# Patient Record
Sex: FEMALE | Race: BLACK OR AFRICAN AMERICAN | Employment: OTHER | URBAN - METROPOLITAN AREA
[De-identification: names, ages, dates, MRNs, and addresses within clinical notes are randomized per-mention and may not be internally consistent; named-entity substitution may affect disease eponyms.]

---

## 2022-05-23 ENCOUNTER — TELEPHONE (OUTPATIENT)
Dept: NEUROLOGY | Age: 87
End: 2022-05-23

## 2022-05-23 NOTE — TELEPHONE ENCOUNTER
The patient doesn't have a new patient appointment until August. Her Dr. Radha Burnett with [facility] wanted to know if she can order a lumbar puncture for the patient or does it need to come from us.

## 2022-05-25 ENCOUNTER — TELEPHONE (OUTPATIENT)
Dept: NEUROLOGY | Age: 87
End: 2022-05-25

## 2022-05-25 NOTE — TELEPHONE ENCOUNTER
LVM that she could certainly place order. It would be up to insurance wether they will cover the test ordered by her vs neuro. Gave number to [facility] Cristina Nova [phone] to have this scheduled.

## 2022-05-25 NOTE — TELEPHONE ENCOUNTER
S/w Dr. Gray Cunha with [facility]  [phone]. 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 Aaron Mason' recs for further eval prior to her appt with him as a NP on 8/1/22. Notified her that Dr. Aaron Mason is out of office until 2nd week of June. She wanted me to forward msg to on call physician for their in put.   She wanted to know if she needs to order LP for neurosyphilis, infectious dx, etc.

## 2022-09-19 ENCOUNTER — TELEPHONE (OUTPATIENT)
Dept: NEUROLOGY | Age: 87
End: 2022-09-19

## 2022-09-19 NOTE — TELEPHONE ENCOUNTER
Patient daughter retuning a call about her mother having blood work complete before her NP on Thursday. Please advise.

## 2022-09-19 NOTE — TELEPHONE ENCOUNTER
Calling to update that request for blood work before appointment needs to go to [facility] on [address]   [address] Please call daughter if labs and blood work are required.

## 2022-09-19 NOTE — TELEPHONE ENCOUNTER
S/w pt's daughter, notified that there would be no orders from this office prior to NP appt. She states she will have [address] [address] fax notes and recent testing to Dr. Maryellen Irving.

## 2022-09-22 ENCOUNTER — OFFICE VISIT (OUTPATIENT)
Dept: NEUROLOGY | Age: 87
End: 2022-09-22

## 2022-09-22 VITALS
WEIGHT: 146 LBS | OXYGEN SATURATION: 97 % | DIASTOLIC BLOOD PRESSURE: 56 MMHG | RESPIRATION RATE: 12 BRPM | BODY MASS INDEX: 26.7 KG/M2 | SYSTOLIC BLOOD PRESSURE: 120 MMHG | HEART RATE: 85 BPM

## 2022-09-22 DIAGNOSIS — R41.0 CONFUSION: Primary | ICD-10-CM

## 2022-09-22 PROCEDURE — 1123F ACP DISCUSS/DSCN MKR DOCD: CPT | Performed by: PSYCHIATRY & NEUROLOGY

## 2022-09-22 PROCEDURE — 99204 OFFICE O/P NEW MOD 45 MIN: CPT | Performed by: PSYCHIATRY & NEUROLOGY

## 2022-09-22 RX ORDER — LISINOPRIL 10 MG/1
TABLET ORAL
COMMUNITY
Start: 2022-08-12

## 2022-09-22 RX ORDER — GLIPIZIDE 5 MG/1
TABLET ORAL
COMMUNITY
Start: 2022-08-12

## 2022-09-22 RX ORDER — OLANZAPINE 5 MG/1
5 TABLET ORAL DAILY
COMMUNITY
Start: 2022-09-09

## 2022-09-22 RX ORDER — MONTELUKAST SODIUM 10 MG/1
TABLET ORAL
COMMUNITY
Start: 2022-06-28

## 2022-09-22 NOTE — PATIENT INSTRUCTIONS
RESULT POLICY      If we have ordered testing for you, know that; \"NO NEWS IS GOOD NEWS! \"     It is our policy that we no longer call patients with results, nor do we  give test results over the phone. We schedule follow up appointments so that your results can be discussed in person. This allows you to address any questions you have regarding the results. If you choose to go to an imaging center outside of [facility], it is your responsibility to bring imaging report and disc to follow up appointment. If something of concern is revealed on your test, we will contact you to discuss the matter and if needed schedule a sooner follow up appointment. Additionally, results may be found by using the My Chart feature and one of our patient service representatives at the  can give you instructions on how to access this feature to utilize our electronic medical record system. Thank you for your understanding. [facility]   Statement to Patients  April 1, 2014      In an effort to ensure the large volume of patient prescription refills is processed in the most efficient and expeditious manner, we are asking our patients to assist us by calling your Pharmacy for all prescription refills, this will include also your  Mail Order Pharmacy. The pharmacy will contact our office electronically to continue the refill process. Please do not wait until the last minute to call your pharmacy. We need at least 48 hours (2days) to fill prescriptions. We also encourage you to call your pharmacy before going to  your prescription to make sure it is ready. With regard to controlled substance prescription refill requests (narcotic refills) that need to be picked up at our office, we ask your cooperation by providing us with at least 72 hours (3days) notice that you will need a refill.     We will not refill narcotic prescription refill requests after 4:00pm on any weekday, Monday through Thursday, or after 2:00pm on Fridays, or on the weekends. We encourage everyone to explore another way of getting your prescription refill request processed using AllofMe, our patient web portal through our electronic medical record system. AllofMe is an efficient and effective way to communicate your medication request directly to the office and  downloadable as an antonietta on your smart phone . All[employer]Me also features a review functionality that allows you to view your medication list as well as leave messages for your physician. Are you ready to get connected? If so please review the attatched instructions or speak to any of our staff to get you set up right away! Thank you so much for your cooperation. Should you have any questions please contact our Practice Administrator. Neurocognitive consult/testing procedure:    Please contact office after scheduling with one of the facilities listed below with whom you are seeing, date and time of appt and we will fax orders and notes tho that facility and schedule your follow up with our office after testing. [address] [facility] Neurology at [facility]  Dr. Kelly Gary [facility] 85  (T) [address] [address] Neurology at [facility]  Dr. Ally Steele   [address] [address]  [address] [address]  (T) [phone]    [facility] [email or web address]  Dr. Christie Dc  [address] [address]  (E) [phone]  (R) [phone]    [email or web address]  Dr. Mena Roe  (V) [phone]  (U) [phone]    [facility] Neuro[facility]ology  [address]  (O) [phone]  (F) [phone]    [facility]ology ([email or web address]   Jalyn Casper  [address] [facility] [address] [facility]  Office: [phone]   Fax: [phone]    [place]   Dr. Nolia Buerger Dr. Mabeline Frater  [address]  (U) [phone]  (S) [phone]    [facility].   Dr. Autumn Guajardo  [address] [place] [address].  [address]  (H) [phone]  (L) [phone]

## 2022-09-22 NOTE — PROGRESS NOTES
Pt took a sec to remember , pt in complete denial  Memory isn't the biggest concern, seems to have manic moments/aggression/mood swings/low energy  Lasts about 6 weeks then will have a normal 6 week  Memory seems to be good per daughter biggest concern is the mood issues  Pt is seeming to be rambling

## 2022-09-22 NOTE — PROGRESS NOTES
[facility] Neurology Clinics and [address] at [facility] Neurology Clinics at [address] [facility], [address] [facility] [address] [facility], [address] [place] [address]  [phone] Office  [phone]           Referring: MD TONYA Moreno [facility] [address],  [address]     No chief complaint on file. 46-year-old lady who presents today accompanied by her daughter for neurologic consultation regarding progressive confusion and changes in her mood etc.  Daughter says that is not really a memory problem is that she is confused and has mood swings. Is been ongoing and progressive over the last year. She notes that she goes through periods where she seems to cycle and right now she is in a manic cycle where she cannot keep track of time and hard for her to keep on task and concentrate. There is a family history of dementia with her son passing of dementia in February. Her sister had dementia. The patient says she saw a psychiatrist about a year ago at [facility] who asked her a lot of questions and said she was fine. Patient loses track of time. Daughter does her bills. 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 She saw infectious disease. She had a lumbar puncture that was said not to demonstrate neurosyphilis. She underwent a series of antibiotic injections which I assume was penicillin. She has not had any recent imaging. Record review finds telephone call to the office from [facility] noting that patient has positive RPR. Past Medical History:   Diagnosis Date    Asthma     Diabetes ([facility] [address]     Hypertension     Other and unspecified symptoms and signs involving general sensations and perceptions     Kidney problem went on dislysis for 3 days but it is working fine now.     Other ill-defined conditions(799.89) Hyperlipidemia       History reviewed. No pertinent surgical history. Current Outpatient Medications   Medication Sig Dispense Refill    glipiZIDE (GLUCOTROL) 5 mg tablet       OLANZapine (ZyPREXA) 5 mg tablet Take 5 mg by mouth daily. lisinopriL (PRINIVIL, ZESTRIL) 10 mg tablet       montelukast (SINGULAIR) 10 mg tablet       fluticasone propion-salmeteroL (ADVAIR/WIXELA) 250-50 mcg/dose diskus inhaler Take 1 Puff by inhalation every twelve (12) hours. albuterol (PROVENTIL VENTOLIN) 2.5 mg /3 mL (0.083 %) nebulizer solution by Nebulization route once. atorvastatin (LIPITOR) 10 mg tablet Take 10 mg by mouth daily. amLODIPine (NORVASC) 2.5 mg tablet Take 2.5 mg by mouth daily. fluocinoLONE (SYNALAR) 0.01 % cream Apply  to affected area two (2) times a day. 15 g 0    doxepin (SINEQUAN) 25 mg capsule Take 1 Cap by mouth nightly. (Patient not taking: Reported on 9/22/2022) 30 Cap 0        No Known Allergies         History reviewed. No pertinent family history. Review of Systems  Pertinent positives and negatives as noted. Examination  Visit Vitals  BP (!) 120/56 (BP 1 Location: Left upper arm, BP Patient Position: Sitting, BP Cuff Size: Adult)   Pulse 85   Resp 12   Wt 66.2 kg (146 lb)   SpO2 97%   BMI 26.70 kg/m²     Pleasant lady. A bit tangential.  She says that September 22, 2022 and were on the second floor in [facility]. Registration 3/3 recall 2/3 and when I give her clues 2/3. She knows the current president and the previous president. She calculates properly. She follows all commands. Cranial nerves are intact 2 through 12. She has no pronation or drift. There is age-related paratonia. She resists fully in the upper and lower extremities in all muscle groups to direct testing. Reflexes symmetrical and globally depressed without pathologic reflex and there is no ataxia.     Impression/Plan  82-year-old lady with episodes of confusion and mood swings per the daughter and the patient says she is fine and question here is whether this is a dementing type process versus a pseudodementia i.e. rooted in underlying psychopathology versus structural versus other  Interesting the positive syphilis diagnosis but again lumbar puncture said to be normal and we will request records in that regard  MRI of the brain  EEG  Carotid Doppler  Formal neuropsychological eval  Follow after    Mónica Naidu MD          This note was created using voice recognition software. Despite editing, there may be syntax errors.

## 2022-09-30 ENCOUNTER — TELEPHONE (OUTPATIENT)
Dept: NEUROLOGY | Age: 87
End: 2022-09-30

## 2022-09-30 NOTE — TELEPHONE ENCOUNTER
Patient daughter is calling to discuss some codes she needs pertaining to testing for her mother. Please call.

## 2023-01-10 ENCOUNTER — TELEPHONE (OUTPATIENT)
Dept: NEUROLOGY | Age: 88
End: 2023-01-10

## 2023-01-10 NOTE — TELEPHONE ENCOUNTER
Regan Morales (Santos Brito is the patient, Giovana Purdy is her daughter) called to ask what are the follow up tests recommended from Sept. 22 office visit w/ Dr. Cindi Bernabe? Pls.call Joann to consult and schedule. ..[phone].

## 2023-01-11 NOTE — TELEPHONE ENCOUNTER
Returned call. Pt decided she will have testing done that Dr. Joey Baum had ordered. Gave number to [facility] to schedule MRI, EEG scheduled for 1/24/23 at 1 pm, [facility] list of neuropsychologists in area to schedule neurocog testing.

## 2023-01-24 ENCOUNTER — OFFICE VISIT (OUTPATIENT)
Dept: NEUROLOGY | Age: 88
End: 2023-01-24
Payer: MEDICARE

## 2023-01-24 DIAGNOSIS — R41.0 CONFUSION: Primary | ICD-10-CM

## 2023-01-24 PROCEDURE — 95816 EEG AWAKE AND DROWSY: CPT | Performed by: PSYCHIATRY & NEUROLOGY

## 2023-01-30 ENCOUNTER — TRANSCRIBE ORDER (OUTPATIENT)
Dept: SCHEDULING | Age: 88
End: 2023-01-30

## 2023-01-30 DIAGNOSIS — F03.90 SENILE DEMENTIA, UNCOMPLICATED (HCC): Primary | ICD-10-CM

## 2023-01-30 NOTE — PROCEDURES
[facility] AT [place]   Electroencephalogram Report    Procedure ID: [id number] Procedure Date: 1/24/2023   Patient Name: Bibiana Whiting YOB: 1935   Procedure Type: Routine Medical Record No: [id number]       An EEG is requested in this 82-year-old lady to evaluate for epileptiform abnormalities. Medications such include Zyprexa, Zestril, Sinequan, Lipitor, Norvasc, Proventil    This tracing is obtained during the awake state. During wakefulness there are brief intermittent runs of posteriorly dominant and symmetric low to medium amplitude 10 cps activities which attenuate with eye opening. Lower voltage faster frequency activities are seen symmetrically over the anterior head regions. Hyperventilation is performed for 3 minutes and little alters the tracing. Intermittent photic stimulation little alters the tracing. Sleep is not attained.     Interpretation  This EEG  recorded during the awake state is normal.        Errol Gomez MD

## 2023-02-07 ENCOUNTER — HOSPITAL ENCOUNTER (OUTPATIENT)
Dept: MRI IMAGING | Age: 88
Discharge: HOME | End: 2023-02-07
Attending: FAMILY MEDICINE
Payer: MEDICARE

## 2023-02-07 DIAGNOSIS — F03.90 SENILE DEMENTIA, UNCOMPLICATED (HCC): ICD-10-CM

## 2023-02-07 PROCEDURE — 70551 MRI BRAIN STEM W/O DYE: CPT

## 2023-02-24 ENCOUNTER — OFFICE VISIT (OUTPATIENT)
Dept: NEUROLOGY | Age: 88
End: 2023-02-24
Payer: MEDICARE

## 2023-02-24 VITALS — RESPIRATION RATE: 20 BRPM | DIASTOLIC BLOOD PRESSURE: 60 MMHG | SYSTOLIC BLOOD PRESSURE: 128 MMHG

## 2023-02-24 DIAGNOSIS — R41.0 CONFUSION: Primary | ICD-10-CM

## 2023-02-24 NOTE — PROGRESS NOTES
Negin Bocanegra is a 82 y.o. female who presents with the following  Chief Complaint   Patient presents with    Follow-up     Test results        HPI  Patient is here today for test results follow-up. Patient was last seen September 22, 2022 by Dr. Kiki Romero for confusion and change in mood etc. over the last year she has a family history of dementia apparently she saw a psychiatrist a year ago and was told she was okay. She loses track of the time, will spend hours gambling at [employer]. Has a history of a positive RPR but lumbar puncture said there was no neurosyphilis. She was treated with antibiotics. Dr. Kiki Romero ordered an EEG that was normal, carotid Dopplers that showed less than 50% bilateral stenosis, neuropsych testing which has not been scheduled yet, an MRI of the brain that showed chronic white matter disease and generalized atrophy with no acute process. No Known Allergies    Current Outpatient Medications   Medication Sig    glipiZIDE (GLUCOTROL) 5 mg tablet     OLANZapine (ZyPREXA) 5 mg tablet Take 5 mg by mouth daily. lisinopriL (PRINIVIL, ZESTRIL) 10 mg tablet     montelukast (SINGULAIR) 10 mg tablet     fluticasone propion-salmeteroL (ADVAIR/WIXELA) 250-50 mcg/dose diskus inhaler Take 1 Puff by inhalation every twelve (12) hours. albuterol (PROVENTIL VENTOLIN) 2.5 mg /3 mL (0.083 %) nebulizer solution by Nebulization route once. atorvastatin (LIPITOR) 10 mg tablet Take 10 mg by mouth daily. amLODIPine (NORVASC) 2.5 mg tablet Take 2.5 mg by mouth daily. fluocinoLONE (SYNALAR) 0.01 % cream Apply  to affected area two (2) times a day. doxepin (SINEQUAN) 25 mg capsule Take 1 Cap by mouth nightly. (Patient not taking: No sig reported)     No current facility-administered medications for this visit.        Social History     Tobacco Use   Smoking Status Not on file   Smokeless Tobacco Not on file       Past Medical History:   Diagnosis Date    Asthma     Diabetes ([facility] [address] Hypertension     Other and unspecified symptoms and signs involving general sensations and perceptions     Kidney problem went on dislysis for 3 days but it is working fine now. Other ill-defined conditions(799.89)     Hyperlipidemia       No past surgical history on file. No family history on file. Social History     Socioeconomic History    Marital status:        ROS    Remainder of comprehensive review is negative. Physical Exam :    Visit Vitals  /60 (BP 1 Location: Right upper arm, BP Patient Position: Sitting, BP Cuff Size: Adult)   Resp 20       General: Well defined, nourished, and groomed individual in no acute distress. Neck: Not tested   musculoskeletal: Not tested   psych: Good mood and sleepy    NEUROLOGICAL EXAMINATION:    Mental Status: Alert and oriented to person, place, and time    Cranial Nerves:    II, III, IV, VI: Visual acuity not tested   visual fields not tested. extra-ocular movements not tested   fundoscopic exam not tested  V-XII: Hearing is grossly intact. Facial features are symmetric, with normal sensation and strength. Motor Examination: Not tested  Coordination: Not tested    Gait and Station: Not tested  Reflexes: Not tested    Results for orders placed or performed during the hospital encounter of 11/23/12   CBC WITH AUTOMATED DIFF   Result Value Ref Range    WBC 7.1 3.6 - 11.0 K/uL    RBC 4.04 3.80 - 5.20 M/uL    HGB 11.6 11.5 - 16.0 g/dL    HCT 35.8 35.0 - 47.0 %    MCV 88.6 80.0 - 99.0 FL    MCH 28.7 26.0 - 34.0 PG    MCHC 32.4 30.0 - 36.5 g/dL    RDW 14.2 11.5 - 14.5 %    PLATELET 483 915 - 199 K/uL    NEUTROPHILS 62 32 - 75 %    LYMPHOCYTES 24 12 - 49 %    MONOCYTES 6 5 - 13 %    EOSINOPHILS 8 (H) 0 - 7 %    BASOPHILS 0 0 - 1 %    ABS. NEUTROPHILS 4.5 1.8 - 8.0 K/UL    ABS. LYMPHOCYTES 1.7 0.8 - 3.5 K/UL    ABS. MONOCYTES 0.4 0.0 - 1.0 K/UL    ABS. EOSINOPHILS 0.5 (H) 0.0 - 0.4 K/UL    ABS.  BASOPHILS 0.0 0.0 - 0.1 K/UL   METABOLIC PANEL, COMPREHENSIVE   Result Value Ref Range    Sodium 143 136 - 145 MMOL/L    Potassium 3.9 3.5 - 5.1 MMOL/L    Chloride 108 97 - 108 MMOL/L    CO2 27 21 - 32 MMOL/L    Anion gap 8 5 - 15 mmol/L    Glucose 100 65 - 100 MG/DL    BUN 23 (H) 6 - 20 MG/DL    Creatinine 0.92 0.45 - 1.15 MG/DL    BUN/Creatinine ratio 25 (H) 12 - 20      GFR est AA >60 >60 ml/min/1.73m2    GFR est non-AA 60 (L) >60 ml/min/1.73m2    Calcium 9.1 8.5 - 10.1 MG/DL    Bilirubin, total 0.3 0.2 - 1.0 MG/DL    ALT (SGPT) 33 12 - 78 U/L    AST (SGOT) 21 15 - 37 U/L    Alk. phosphatase 77 50 - 136 U/L    Protein, total 7.0 6.4 - 8.2 g/dL    Albumin 3.7 3.5 - 5.0 g/dL    Globulin 3.3 2.0 - 4.0 g/dL    A-G Ratio 1.1 1.1 - 2.2         No orders of the defined types were placed in this encounter. 1. Confusion      Discussed neuropsych testing the patient's daughter stated she is having a hard time finding any availability soon from the list that was given to her from this office. Advised the patient to set up an appointment with Dr. Heather Caro and to keep calling to see if there is cancellations. Advised patient that follow-up with neurology will be based on neuropsych physician suggestion after testing.               This note will not be viewable in [facility]

## 2024-01-02 ENCOUNTER — TELEPHONE (OUTPATIENT)
Age: 89
End: 2024-01-02

## 2024-01-02 NOTE — TELEPHONE ENCOUNTER
Called to schedule pt for testing and verify insurance information on file. VM box was full, unable to leave message.

## 2024-01-05 ENCOUNTER — OFFICE VISIT (OUTPATIENT)
Age: 89
End: 2024-01-05

## 2024-01-05 DIAGNOSIS — Z91.199 NO-SHOW FOR APPOINTMENT: Primary | ICD-10-CM

## 2025-04-01 ENCOUNTER — TELEPHONE (OUTPATIENT)
Age: 89
End: 2025-04-01

## 2025-04-01 NOTE — TELEPHONE ENCOUNTER
Patient daugther calling to schedule NP appt for Biopolar Disorder    Referral in [facility]

## 2025-04-18 ENCOUNTER — HOSPITAL ENCOUNTER (EMERGENCY)
Facility: HOSPITAL | Age: 89
Discharge: HOME | End: 2025-04-18
Attending: STUDENT IN AN ORGANIZED HEALTH CARE EDUCATION/TRAINING PROGRAM
Payer: MEDICARE

## 2025-04-18 VITALS
SYSTOLIC BLOOD PRESSURE: 154 MMHG | TEMPERATURE: 98.4 F | HEIGHT: 62 IN | WEIGHT: 147 LBS | DIASTOLIC BLOOD PRESSURE: 78 MMHG | OXYGEN SATURATION: 99 % | BODY MASS INDEX: 27.05 KG/M2 | RESPIRATION RATE: 16 BRPM | HEART RATE: 88 BPM

## 2025-04-18 DIAGNOSIS — F31.9 BIPOLAR AFFECTIVE DISORDER, REMISSION STATUS UNSPECIFIED (HCC): Primary | ICD-10-CM

## 2025-04-18 DIAGNOSIS — E11.65 TYPE 2 DIABETES MELLITUS WITH HYPERGLYCEMIA, WITHOUT LONG-TERM CURRENT USE OF INSULIN (HCC): ICD-10-CM

## 2025-04-18 LAB
ALBUMIN SERPL-MCNC: 3.3 G/DL (ref 3.5–5)
ALBUMIN/GLOB SERPL: 1 (ref 1.1–2.2)
ALP SERPL-CCNC: 101 U/L (ref 45–117)
ALT SERPL-CCNC: 29 U/L (ref 12–78)
AMPHET UR QL SCN: NEGATIVE
ANION GAP SERPL CALC-SCNC: 10 MMOL/L (ref 2–12)
APPEARANCE UR: CLEAR
AST SERPL-CCNC: 14 U/L (ref 15–37)
BACTERIA URNS QL MICRO: NEGATIVE /HPF
BARBITURATES UR QL SCN: NEGATIVE
BASOPHILS # BLD: 0.02 K/UL (ref 0–0.1)
BASOPHILS NFR BLD: 0.2 % (ref 0–1)
BENZODIAZ UR QL: NEGATIVE
BILIRUB SERPL-MCNC: 0.5 MG/DL (ref 0.2–1)
BILIRUB UR QL: NEGATIVE
BUN SERPL-MCNC: 21 MG/DL (ref 6–20)
BUN/CREAT SERPL: 16 (ref 12–20)
CALCIUM SERPL-MCNC: 9.1 MG/DL (ref 8.5–10.1)
CANNABINOIDS UR QL SCN: NEGATIVE
CHLORIDE SERPL-SCNC: 98 MMOL/L (ref 97–108)
CO2 SERPL-SCNC: 23 MMOL/L (ref 21–32)
COCAINE UR QL SCN: NEGATIVE
COLOR UR: ABNORMAL
COMMENT:: NORMAL
CREAT SERPL-MCNC: 1.31 MG/DL (ref 0.55–1.02)
DIFFERENTIAL METHOD BLD: ABNORMAL
EOSINOPHIL # BLD: 0 K/UL (ref 0–0.4)
EOSINOPHIL NFR BLD: 0 % (ref 0–7)
EPITH CASTS URNS QL MICRO: ABNORMAL /LPF
ERYTHROCYTE [DISTWIDTH] IN BLOOD BY AUTOMATED COUNT: 13.3 % (ref 11.5–14.5)
ETHANOL SERPL-MCNC: <10 MG/DL (ref 0–0.08)
GLOBULIN SER CALC-MCNC: 3.4 G/DL (ref 2–4)
GLUCOSE BLD STRIP.AUTO-MCNC: 321 MG/DL (ref 65–117)
GLUCOSE SERPL-MCNC: 368 MG/DL (ref 65–100)
GLUCOSE UR STRIP.AUTO-MCNC: >1000 MG/DL
HCT VFR BLD AUTO: 31.5 % (ref 35–47)
HGB BLD-MCNC: 10.5 G/DL (ref 11.5–16)
HGB UR QL STRIP: NEGATIVE
IMM GRANULOCYTES # BLD AUTO: 0.06 K/UL (ref 0–0.04)
IMM GRANULOCYTES NFR BLD AUTO: 0.6 % (ref 0–0.5)
KETONES UR QL STRIP.AUTO: ABNORMAL MG/DL
LEUKOCYTE ESTERASE UR QL STRIP.AUTO: NEGATIVE
LYMPHOCYTES # BLD: 0.64 K/UL (ref 0.8–3.5)
LYMPHOCYTES NFR BLD: 6.9 % (ref 12–49)
Lab: NORMAL
MCH RBC QN AUTO: 28.2 PG (ref 26–34)
MCHC RBC AUTO-ENTMCNC: 33.3 G/DL (ref 30–36.5)
MCV RBC AUTO: 84.7 FL (ref 80–99)
METHADONE UR QL: NEGATIVE
MONOCYTES # BLD: 0.11 K/UL (ref 0–1)
MONOCYTES NFR BLD: 1.2 % (ref 5–13)
NEUTS SEG # BLD: 8.47 K/UL (ref 1.8–8)
NEUTS SEG NFR BLD: 91.1 % (ref 32–75)
NITRITE UR QL STRIP.AUTO: NEGATIVE
NRBC # BLD: 0 K/UL (ref 0–0.01)
NRBC BLD-RTO: 0 PER 100 WBC
OPIATES UR QL: NEGATIVE
PCP UR QL: NEGATIVE
PH UR STRIP: 5 (ref 5–8)
PLATELET # BLD AUTO: 267 K/UL (ref 150–400)
PMV BLD AUTO: 10.2 FL (ref 8.9–12.9)
POTASSIUM SERPL-SCNC: 4.6 MMOL/L (ref 3.5–5.1)
PROT SERPL-MCNC: 6.7 G/DL (ref 6.4–8.2)
PROT UR STRIP-MCNC: 30 MG/DL
RBC # BLD AUTO: 3.72 M/UL (ref 3.8–5.2)
RBC #/AREA URNS HPF: ABNORMAL /HPF (ref 0–5)
RBC MORPH BLD: ABNORMAL
SERVICE CMNT-IMP: ABNORMAL
SODIUM SERPL-SCNC: 131 MMOL/L (ref 136–145)
SP GR UR REFRACTOMETRY: 1.02
SPECIMEN HOLD: NORMAL
TSH SERPL DL<=0.05 MIU/L-ACNC: 0.63 UIU/ML (ref 0.36–3.74)
URINE CULTURE IF INDICATED: ABNORMAL
UROBILINOGEN UR QL STRIP.AUTO: 0.2 EU/DL (ref 0.2–1)
WBC # BLD AUTO: 9.3 K/UL (ref 3.6–11)
WBC URNS QL MICRO: ABNORMAL /HPF (ref 0–4)

## 2025-04-18 PROCEDURE — 85025 COMPLETE CBC W/AUTO DIFF WBC: CPT

## 2025-04-18 PROCEDURE — 6370000000 HC RX 637 (ALT 250 FOR IP): Performed by: STUDENT IN AN ORGANIZED HEALTH CARE EDUCATION/TRAINING PROGRAM

## 2025-04-18 PROCEDURE — 84443 ASSAY THYROID STIM HORMONE: CPT

## 2025-04-18 PROCEDURE — 80053 COMPREHEN METABOLIC PANEL: CPT

## 2025-04-18 PROCEDURE — 81001 URINALYSIS AUTO W/SCOPE: CPT

## 2025-04-18 PROCEDURE — 96360 HYDRATION IV INFUSION INIT: CPT

## 2025-04-18 PROCEDURE — 80307 DRUG TEST PRSMV CHEM ANLYZR: CPT

## 2025-04-18 PROCEDURE — 2580000003 HC RX 258: Performed by: STUDENT IN AN ORGANIZED HEALTH CARE EDUCATION/TRAINING PROGRAM

## 2025-04-18 PROCEDURE — 82962 GLUCOSE BLOOD TEST: CPT

## 2025-04-18 PROCEDURE — 99284 EMERGENCY DEPT VISIT MOD MDM: CPT

## 2025-04-18 PROCEDURE — 82077 ASSAY SPEC XCP UR&BREATH IA: CPT

## 2025-04-18 RX ORDER — QUETIAPINE FUMARATE 25 MG/1
100 TABLET, FILM COATED ORAL ONCE
Status: COMPLETED | OUTPATIENT
Start: 2025-04-18 | End: 2025-04-18

## 2025-04-18 RX ORDER — 0.9 % SODIUM CHLORIDE 0.9 %
500 INTRAVENOUS SOLUTION INTRAVENOUS ONCE
Status: COMPLETED | OUTPATIENT
Start: 2025-04-18 | End: 2025-04-18

## 2025-04-18 RX ADMIN — QUETIAPINE FUMARATE 100 MG: 25 TABLET ORAL at 18:34

## 2025-04-18 RX ADMIN — SODIUM CHLORIDE 500 ML: 9 INJECTION, SOLUTION INTRAVENOUS at 18:34

## 2025-04-18 ASSESSMENT — PAIN - FUNCTIONAL ASSESSMENT: PAIN_FUNCTIONAL_ASSESSMENT: NONE - DENIES PAIN

## 2025-04-18 NOTE — ED NOTES
Pt presents ambulatory to ED with [employer] under ECO after daughter executed ECO stating that she is not taking her medications, gambling, and has pressured speech. Pt continuing to say, \"My daughter is trying to put me away. She wants to take my house and my land\". Pt is alert and oriented x 4, RR even and unlabored, skin is warm and dry. Assesment completed and pt updated on plan of care.       Emergency Department Nursing Plan of Care       The Nursing Plan of Care is developed from the Nursing assessment and Emergency Department Attending provider initial evaluation.  The plan of care may be reviewed in the “ED Provider note”.    The Plan of Care was developed with the following considerations:   Patient / Family readiness to learn indicated by:verbalized understanding  Persons(s) to be included in education: patient  Barriers to Learning/Limitations:None    Signed

## 2025-04-18 NOTE — ED NOTES
Verbal shift change report given to Amaury (oncoming nurse) by Linda (offgoing nurse). Report included the following information Nurse Handoff Report.

## 2025-04-19 NOTE — ED PROVIDER NOTES
[facility] EMERGENCY DEPARTMENT  EMERGENCY DEPARTMENT ENCOUNTER       Pt Name: Barb Cotton  MRN: [id number]  Birthdate 1935  Date of evaluation: 4/18/2025  Provider: Adrián Sagastume MD   PCP: No primary care provider on file.  Note Started: 8:16 PM EDT 4/18/25     CHIEF COMPLAINT       Chief Complaint   Patient presents with    Mental Health Problem        HISTORY OF PRESENT ILLNESS: 1 or more elements      History From: patient and police, History limited by: none    Barb Cotton is a 89 y.o. female w hx of bipolar disorder, HTN, asthma, and DM who presents to the emergency department with police under an ECO.  ECO was initiated by her daughter.  Daughter states that she has been gambling and with pressured speech - daughter is concerned for john.  Daughter reports that she has not been taking her medications.  Patient does not have any complaints.  Patient states that family recently saw a  to discuss documentation such as nazario and daughter was upset following this.  Patient does endorse gambling, but she states that this is none of her daughters bsiness.  Patient denies SI, HI, or hallucinations.    Please See MDM for Additional Details of the HPI/PMH  Nursing Notes were all reviewed and agreed with or any disagreements were addressed in the HPI.     REVIEW OF SYSTEMS        Positives and Pertinent negatives as per HPI.    PAST HISTORY     Past Medical History:  Past Medical History:   Diagnosis Date    Asthma     Diabetes (HCC)     Hypertension     Other and unspecified symptoms and signs involving general sensations and perceptions     Kidney problem went on dislysis for 3 days but it is working fine now.    Other ill-defined conditions(799.89)     Hyperlipidemia       Past Surgical History:  History reviewed. No pertinent surgical history.    Family History:  History reviewed. No pertinent family history.    Social History:  Social History     Tobacco Use    Smoking status: Never

## 2025-07-14 ENCOUNTER — TELEPHONE (OUTPATIENT)
Age: 89
End: 2025-07-14

## 2025-07-14 NOTE — TELEPHONE ENCOUNTER
Laura william called to cancel, Barb's procedure and f/u appts with Dr Cui, Barb doesn't want to come.